# Patient Record
(demographics unavailable — no encounter records)

---

## 2025-07-21 NOTE — PROCEDURE
[FreeTextEntry1] : 1) Right Achilles tendinitis with posterior heel spur: utilizing aseptic technique with alcohol sterile prep, utilizing ethyl chloride topical anesthesia, a therapeutic injection mixture of 1 cc of Kenalog 40mg/mL + 1.7 cc of 2% lidocaine with epinephrine 1:43390 (Lot # [id number], Exp 10/31/2026, NDC# [address]-15) was administered. Recommend use of proper fit shoes with supportive insoles/orthotics, patient educated on and was given daily stretching exercises handout, recommend use of night splint for non-weight bearing stretches, recommend use of Prostretch device for weight-bearing stretches while keeping stable balance, recommended use of heel cups/heel sleeves  Patient tolerated all treatments well and without any complications

## 2025-07-21 NOTE — REVIEW OF SYSTEMS
[Joint Swelling] : no joint swelling [Joint Stiffness] : no joint stiffness [Limb Swelling] : no limb swelling [Skin Lesions] : no skin lesions [Skin Wound] : no skin wound [Itching] : no itching [Dry Skin] : dry skin [Confused] : no confusion [Convulsions] : no convulsions [Dizziness] : no dizziness [Fainting] : no fainting [Negative] : Psychiatric

## 2025-07-21 NOTE — PHYSICAL EXAM
[General Appearance - Alert] : alert [General Appearance - In No Acute Distress] : in no acute distress [Delayed in the Right Toes] : capillary refills normal in right toes [Delayed in the Left Toes] : capillary refills normal in the left toes [2+] : left foot dorsalis pedis 2+ [] : normal strength/tone [de-identified] : ROM of ankle, subtalar, midtarsal, MPJ, IPJ are adequate bilateral 5/5 muscle power in inversion, eversion, dorsiflexion, plantarflexion bilateral  [Skin Turgor] : normal skin turgor [Foot Ulcer] : no foot ulcer [FreeTextEntry1] : gross epicritic sensation to feet diminished, light touch sensation intact, sharp/dull sensation intact  [Oriented To Time, Place, And Person] : oriented to person, place, and time [Impaired Insight] : insight and judgment were intact [Affect] : the affect was normal

## 2025-07-21 NOTE — REASON FOR VISIT
[Initial Visit] : an initial visit for [FreeTextEntry2] : Right Achilles tendinitis with posterior heel spur, Right 1st MPJ osteoarthritis

## 2025-07-21 NOTE — HISTORY OF PRESENT ILLNESS
[FreeTextEntry1] : Mr. GILLIAN NAVARRO is a 52 year male who is seen in the office for Right Achilles tendinitis with posterior heel spur, Right 1st MPJ osteoarthritis. Patient denies any current or recent fever, chills, nausea, vomiting, SOB, or chest pain. AAOx3 and in NAD.  Patient mentions pain to Right heel started after a lot of walking activity on 07/11/2025 07/13/2025: outside facility Right foot x-rays reviewed: +Right Achilles enthesopathy with posterior heel spur, Right 1st MPJ osteoarthritis with dorsal spur

## 2025-07-21 NOTE — ASSESSMENT
[FreeTextEntry1] : 1) Right Achilles tendinitis with posterior heel spur: utilizing aseptic technique with alcohol sterile prep, utilizing ethyl chloride topical anesthesia, a therapeutic injection mixture of 1 cc of Kenalog 40mg/mL + 1.7 cc of 2% lidocaine with epinephrine 1:99817 (Lot # [id number], Exp 10/31/2026, NDC# [address]-15) was administered. Recommend use of proper fit shoes with supportive insoles/orthotics, patient educated on and was given daily stretching exercises handout, recommend use of night splint for non-weight bearing stretches, recommend use of Prostretch device for weight-bearing stretches while keeping stable balance, recommended use of heel cups/heel sleeves 2) Right 1st MPJ osteoarthritis: recommend use of proper fit shoes with supportive/accommodative insoles, recommend glucosamine chondroitin supplement, use of topical OTC anti-inflammatory prn  Patient tolerated all treatments well and without any complications Follow up in 3 months     [Verbal] : verbal [Patient] : patient [Good - alert, interested, motivated] : Good - alert, interested, motivated [Verbalizes knowledge/Understanding] : Verbalizes knowledge/understanding [Pt responsibility to plan of care] : patient responsibility to plan of care